# Patient Record
Sex: FEMALE | Race: WHITE
[De-identification: names, ages, dates, MRNs, and addresses within clinical notes are randomized per-mention and may not be internally consistent; named-entity substitution may affect disease eponyms.]

---

## 2019-06-03 ENCOUNTER — HOSPITAL ENCOUNTER (OUTPATIENT)
Dept: HOSPITAL 92 - SCSMRI | Age: 19
Discharge: HOME | End: 2019-06-03
Attending: ORTHOPAEDIC SURGERY
Payer: COMMERCIAL

## 2019-06-03 DIAGNOSIS — M79.A21: ICD-10-CM

## 2019-06-03 DIAGNOSIS — Z98.890: ICD-10-CM

## 2019-06-03 DIAGNOSIS — M79.A22: Primary | ICD-10-CM

## 2019-06-03 PROCEDURE — 74185 MRA ABD W OR W/O CNTRST: CPT

## 2019-06-03 PROCEDURE — C8900 MRA W/CONT, ABD: HCPCS

## 2019-06-03 PROCEDURE — A9577 INJ MULTIHANCE: HCPCS

## 2019-06-03 PROCEDURE — C8912 MRA W/CONT, LWR EXT: HCPCS

## 2019-06-03 NOTE — MRI
MRA ABDOMEN AND PELVIS WITH CONTRAST:

 

06/03/2019

 

TECHNIQUE:

Coronal slab MRA performed.

 

FINDINGS:

The majority of the visualized descending thoracic aorta is unremarkable.  The abdominal aorta is unr
emarkable.  No evidence of aortic dissections or aneurysms seen.  The SMA, celiac, and inferior mesen
teric artery, as well as renal arteries, are all patent.

 

The right and left common and external iliac arteries are patent.

 

IMPRESSION:

Unremarkable abdominal magnetic resonance angiography and pelvic magnetic resonance angiography.

 

POS: [facility]

## 2019-06-03 NOTE — MRI
MRA BILATERAL LOWER EXTREMITIES:

 

HISTORY:

Tingling and numbness.

 

TECHNIQUE:

Coronal slab contrast enhanced MRA performed.

 

FINDINGS:

The right and left external iliac arteries, the common femoral arteries, the superficial femoral shea
tran, the popliteal arteries, the anterior tibial arteries, the posterior tibial arteries, and the pe
roneal arteries are all visualized and are patent.  No significant evidence of arterial occlusion is 
seen.

 

IMPRESSION:

Normal bilateral lower extremity arterial magnetic resonance angiography.

 

POS: [facility]

## 2019-08-05 ENCOUNTER — HOSPITAL ENCOUNTER (EMERGENCY)
Dept: HOSPITAL 92 - ERS | Age: 19
Discharge: HOME | End: 2019-08-05
Payer: COMMERCIAL

## 2019-08-05 DIAGNOSIS — Z79.899: ICD-10-CM

## 2019-08-05 DIAGNOSIS — S83.91XA: Primary | ICD-10-CM
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
## 2019-08-05 PROCEDURE — 96372 THER/PROPH/DIAG INJ SC/IM: CPT

## 2019-08-05 PROCEDURE — 99282 EMERGENCY DEPT VISIT SF MDM: CPT
